# Patient Record
Sex: MALE | Race: BLACK OR AFRICAN AMERICAN | URBAN - METROPOLITAN AREA
[De-identification: names, ages, dates, MRNs, and addresses within clinical notes are randomized per-mention and may not be internally consistent; named-entity substitution may affect disease eponyms.]

---

## 2022-01-28 ENCOUNTER — LAB OUTSIDE AN ENCOUNTER (OUTPATIENT)
Dept: URBAN - METROPOLITAN AREA CLINIC 92 | Facility: CLINIC | Age: 57
End: 2022-01-28

## 2022-01-28 ENCOUNTER — WEB ENCOUNTER (OUTPATIENT)
Dept: URBAN - METROPOLITAN AREA CLINIC 92 | Facility: CLINIC | Age: 57
End: 2022-01-28

## 2022-01-28 ENCOUNTER — TELEPHONE ENCOUNTER (OUTPATIENT)
Dept: URBAN - METROPOLITAN AREA CLINIC 92 | Facility: CLINIC | Age: 57
End: 2022-01-28

## 2022-01-28 ENCOUNTER — OFFICE VISIT (OUTPATIENT)
Dept: URBAN - METROPOLITAN AREA CLINIC 92 | Facility: CLINIC | Age: 57
End: 2022-01-28
Payer: COMMERCIAL

## 2022-01-28 ENCOUNTER — DASHBOARD ENCOUNTERS (OUTPATIENT)
Age: 57
End: 2022-01-28

## 2022-01-28 VITALS
DIASTOLIC BLOOD PRESSURE: 84 MMHG | TEMPERATURE: 97.2 F | WEIGHT: 216 LBS | BODY MASS INDEX: 31.99 KG/M2 | HEIGHT: 69 IN | RESPIRATION RATE: 73 BRPM | SYSTOLIC BLOOD PRESSURE: 129 MMHG

## 2022-01-28 DIAGNOSIS — K62.5 BRBPR (BRIGHT RED BLOOD PER RECTUM): ICD-10-CM

## 2022-01-28 PROCEDURE — 99243 OFF/OP CNSLTJ NEW/EST LOW 30: CPT | Performed by: INTERNAL MEDICINE

## 2022-01-28 PROCEDURE — 99203 OFFICE O/P NEW LOW 30 MIN: CPT | Performed by: INTERNAL MEDICINE

## 2022-01-28 RX ORDER — SODIUM, POTASSIUM,MAG SULFATES 17.5-3.13G
3000ML SOLUTION, RECONSTITUTED, ORAL ORAL
Qty: 1 BOTTLE | Refills: 0 | OUTPATIENT
Start: 2022-01-28

## 2022-01-28 NOTE — HPI-TODAY'S VISIT:
57-year-old male with a history of obesity, diabetes, hypertension, who presents to discuss positive fecal occult blood test.  Recently had hematochezia for 2 to 3 days, blood in bowl, stool, paper.  FOBT performed and positive.  Last colonoscopy 2017 with 2 small TAs.  No family history of colon cancer.  Denies any abdominal or anal pain at the time of bleeding.  Patient referred by Dr. Chaney, and note will be sent.

## 2022-02-04 ENCOUNTER — TELEPHONE ENCOUNTER (OUTPATIENT)
Dept: URBAN - METROPOLITAN AREA CLINIC 92 | Facility: CLINIC | Age: 57
End: 2022-02-04

## 2022-02-04 ENCOUNTER — ERX REFILL RESPONSE (OUTPATIENT)
Dept: URBAN - METROPOLITAN AREA CLINIC 92 | Facility: CLINIC | Age: 57
End: 2022-02-04

## 2022-02-04 RX ORDER — SODIUM, POTASSIUM,MAG SULFATES 17.5-3.13G
3000ML SOLUTION, RECONSTITUTED, ORAL ORAL
Qty: 1 BOTTLE | Refills: 0 | Status: ACTIVE | COMMUNITY
Start: 2022-01-28

## 2022-02-04 RX ORDER — SODIUM, POTASSIUM,MAG SULFATES 17.5-3.13G
3000ML SOLUTION, RECONSTITUTED, ORAL ORAL
Qty: 1 BOTTLE | Refills: 0 | OUTPATIENT
Start: 2022-02-04

## 2022-02-04 RX ORDER — SODIUM, POTASSIUM,MAG SULFATES 17.5-3.13G
3000ML SOLUTION, RECONSTITUTED, ORAL ORAL
Qty: 1 BOTTLE | Refills: 0 | OUTPATIENT

## 2022-02-07 ENCOUNTER — TELEPHONE ENCOUNTER (OUTPATIENT)
Dept: URBAN - METROPOLITAN AREA CLINIC 92 | Facility: CLINIC | Age: 57
End: 2022-02-07

## 2022-02-07 RX ORDER — SODIUM, POTASSIUM,MAG SULFATES 17.5-3.13G
3000ML SOLUTION, RECONSTITUTED, ORAL ORAL
Qty: 1 BOTTLE | Refills: 0 | Status: ACTIVE | COMMUNITY

## 2022-02-07 RX ORDER — SODIUM, POTASSIUM,MAG SULFATES 17.5-3.13G
3000ML SOLUTION, RECONSTITUTED, ORAL ORAL
Qty: 1 BOTTLE | Refills: 0 | OUTPATIENT
Start: 2022-02-07

## 2022-02-16 PROBLEM — 74474003: Status: ACTIVE | Noted: 2022-02-16

## 2022-03-03 ENCOUNTER — OFFICE VISIT (OUTPATIENT)
Dept: URBAN - METROPOLITAN AREA SURGERY CENTER 16 | Facility: SURGERY CENTER | Age: 57
End: 2022-03-03
Payer: COMMERCIAL

## 2022-03-03 DIAGNOSIS — K92.1 ACUTE MELENA: ICD-10-CM

## 2022-03-03 DIAGNOSIS — K63.5 BENIGN COLON POLYP: ICD-10-CM

## 2022-03-03 PROCEDURE — 45380 COLONOSCOPY AND BIOPSY: CPT | Performed by: INTERNAL MEDICINE

## 2022-03-03 PROCEDURE — G8907 PT DOC NO EVENTS ON DISCHARG: HCPCS | Performed by: INTERNAL MEDICINE

## 2022-03-03 PROCEDURE — 45385 COLONOSCOPY W/LESION REMOVAL: CPT | Performed by: INTERNAL MEDICINE
